# Patient Record
Sex: MALE | Race: WHITE
[De-identification: names, ages, dates, MRNs, and addresses within clinical notes are randomized per-mention and may not be internally consistent; named-entity substitution may affect disease eponyms.]

---

## 2018-02-20 ENCOUNTER — HOSPITAL ENCOUNTER (EMERGENCY)
Dept: HOSPITAL 80 - FED | Age: 38
Discharge: HOME | End: 2018-02-20
Payer: COMMERCIAL

## 2018-02-20 VITALS
OXYGEN SATURATION: 98 % | TEMPERATURE: 97.7 F | SYSTOLIC BLOOD PRESSURE: 112 MMHG | HEART RATE: 99 BPM | RESPIRATION RATE: 16 BRPM | DIASTOLIC BLOOD PRESSURE: 45 MMHG

## 2018-02-20 DIAGNOSIS — E86.9: ICD-10-CM

## 2018-02-20 DIAGNOSIS — F17.200: ICD-10-CM

## 2018-02-20 DIAGNOSIS — N41.9: Primary | ICD-10-CM

## 2018-02-20 LAB
INR PPP: 1.04 (ref 0.83–1.16)
PLATELET # BLD: 267 10^3/UL (ref 150–400)
PROTHROMBIN TIME: 13.8 SEC (ref 12–15)

## 2018-02-20 NOTE — EDPHY
H & P


Stated Complaint: penis pain


Time Seen by Provider: 02/20/18 12:28


HPI/ROS: 





CHIEF COMPLAINT:  Penile pain





HISTORY OF PRESENT ILLNESS:  The patient is a 37-year-old man who is here with 

his partner complaining of pain in his penile shaft.  He states that he was 

diagnosed 4 weeks ago with prostatitis and put on a course of Flomax and 

Bactrim.  His symptoms improved over the 14 day course.  He felt well until 

about 5 days ago when he started having pain again that felt similar to when he 

had prostate.  The pain was mostly with defecation.  He states that he was 

restarted on his Bactrim and Flomax and that after about 48 hr his symptoms 

improved.  Yesterday however he began having pain in both inguinal regions as 

well as burning with urination.  No discharge.  No rectal or perineal pain.  No 

fever.  He states that the inguinal pain however resolved and now is having 

pain in the shaft of his penis.  No swelling or erythema.  No testicular pain.  

He states that it was severe and intense and intermittent.  He states that it 

has now resolved.  It was most intense about an hour ago and he thought that he 

might die.  He did take naproxen about 4 hr ago with some improvement.  No 

history of abdominal surgery.








REVIEW OF SYSTEMS:


Constitutional:  denies: chills, fever, recent illness, recent injury


EENTM: denies: blurred vision, double vision, nose congestion


Respiratory: denies: cough, shortness of breath


Cardiac: denies: chest pain, irregular heart rate, lightheadedness, palpitations


Gastrointestinal/Abdominal: denies: abdominal pain, diarrhea, nausea, vomiting, 

blood streaked stools


Genitourinary:  See HPI


Musculoskeletal: denies: joint pain, muscle pain


Skin: denies: lesions, rash, jaundice, bruising


Neurological: denies: headache, numbness, paresthesia, tingling, dizziness, 

weakness


Hematologic/Lymphatic: denies: blood clots, easy bleeding, easy bruising


Immunologic/allergic: denies: HIV/AIDS, transplant








EXAM:


GENERAL:  Well-appearing, well-nourished and in no acute distress.


HEAD:  Atraumatic, normocephalic.


EYES:  Pupils equal round and reactive to light, extraocular movements intact, 

sclera anicteric, conjunctiva are normal.


ENT:  TMs normal, nares patent, oropharynx clear without exudates.  Moist 

mucous membranes.


NECK:  Normal range of motion, supple without lymphadenopathy or JVD.


LUNGS:  Breath sounds clear to auscultation bilaterally and equal.  No wheezes 

rales or rhonchi.


HEART:  Regular rate and rhythm without murmurs, rubs or gallops.


ABDOMEN:  Soft, nontender, normoactive bowel sounds.  No guarding, no rebound.  

No masses appreciated.  No palpable hernia, nontender and nonswollen testicles, 

no swelling or erythema to the penile shaft.  No discharge with milking but 

significant pain.


BACK:  No CVA tenderness, no spinal tenderness, step-offs or deformities


EXTREMITIES:  Normal range of motion, no pitting or edema.  No clubbing or 

cyanosis.


NEUROLOGICAL:  Cranial nerves II through XII grossly intact.  Normal speech, 

normal gait.  5/5 strength, normal movement in all extremities, normal sensation


PSYCH:  Normal mood, normal affect.


SKIN:  Warm, dry, normal turgor, no visible rashes or lesions.








Source: Patient


Exam Limitations: No limitations





- Personal History


Current Tetanus/Diphtheria Vaccine: Yes


Current Tetanus Diphtheria and Acellular Pertussis (TDAP): Yes





- Medical/Surgical History


Hx Asthma: No


Hx Chronic Respiratory Disease: No


Hx Diabetes: No


Hx Cardiac Disease: No


Hx Renal Disease: No


Hx Cirrhosis: No


Hx Alcoholism: No


Hx HIV/AIDS: No


Hx Splenectomy or Spleen Trauma: No


Other PMH: prostatitis,





- Family History


Significant Family History: No pertinent family hx





- Social History


Smoking Status: Current every day smoker


Alcohol Use: Sober


Drug Use: None


Constitutional: 


 Initial Vital Signs











Temperature (C)  36.9 C   02/20/18 12:16


 


Heart Rate  113 H  02/20/18 12:16


 


Respiratory Rate  24 H  02/20/18 12:16


 


Blood Pressure  117/102 H  02/20/18 12:16


 


O2 Sat (%)  95   02/20/18 12:16








 











O2 Delivery Mode               Room Air














Allergies/Adverse Reactions: 


 





erythromycin base Allergy (Verified 02/20/18 12:14)


 








Home Medications: 














 Medication  Instructions  Recorded


 


QUEtiapine FUMARATE [Seroquel] 0 02/17/12


 


Ambien  02/20/18


 


Ciprofloxacin [Cipro] 500 mg PO BID #60 tab 02/20/18


 


Dexedrine  02/20/18


 


Diazepam  02/20/18


 


Flomax  02/20/18


 


LaMICtal  02/20/18


 


Naproxen  02/20/18


 


Phenazopyridine HCl [Pyridium] 200 mg PO TID #6 tab 02/20/18














Medical Decision Making





- Diagnostics


EKG Interpretation: 





An EKG obtained and was read and documented in trace view.  Please see trace 

view for full reading and report.  Sinus rhythm, normal QT interval 


Imaging Results: 


 Imaging Impressions





Abdomen/Pelvis CT  02/20/18 13:04


Impression: 


1. No evidence of urinary tract calculus.


2. No significant abnormality identified within the abdomen and pelvis.


3. Mild constipation.


 


Attention: This CT examination is specifically designed to evaluate patients 

who are clinically suspected of having acute obstructive uropathy.  This 

examination does not use radiographic contrast, and as such, provides only a  

limited evaluation of the abdomen, pelvis and retroperitoneum.  If there is 

further clinical suspicion for pathological conditions other than obstructive 

uropathy, a complete CT evaluation of the abdomen and pelvis utilizing 

intravenous, oral, and rectal contrast should be considered. 


 


Findings discussed with Carlos Marin M.D.  at  13:50 hour, 2/20/2018.


 


 


 











Imaging: Discussed imaging studies w/ On call Radiologist


ED Course/Re-evaluation: 





We discussed the patient's lab work and CT scan which are reassuring.  I 

suspect that he has prostatitis resistant to Bactrim.  I will switch him to 

Cipro.  We discussed the risks of taking fluoroquinolones and encouraged him 

not to stresses tendons.  He agrees with this.  He also will continue taking 

naproxen and I will have him continue taking Flomax in add Pyridium.  We 

discussed indications for returning.  He is happy with this plan.  I will 

obtain an EKG to make sure he does not have a long QT.


Differential Diagnosis: 





Partial list of the Differential diagnosis considered include but were not 

limited to;  prostatitis, SVT, urinary tract infection, kidney stone and 

although unlikely based on the history and physical exam, I also considered 

hernia, testicular torsion, bladder stone, diverticulitis, appendicitis.  I 

discussed these differential diagnoses and the plan with the patient as well as 

the usual and expected course.  The patient understands that the diagnosis is 

provisional and that in medicine we are not always correct and that further 

workup is often warranted.  Usual and customary warnings were given.  All of 

the patient's questions were answered.  The patient was instructed to return to 

the emergency department should the symptoms at all worsen or return, otherwise 

to followup with the physician as we discussed.





- Data Points


Laboratory Results: 


 Laboratory Results





 02/20/18 12:30 





 02/20/18 12:30 





 











  02/20/18 02/20/18 02/20/18





  12:30 12:30 12:30


 


WBC      





    


 


RBC      





    


 


Hgb      





    


 


Hct      





    


 


MCV      





    


 


MCH      





    


 


MCHC      





    


 


RDW      





    


 


Plt Count      





    


 


MPV      





    


 


Neut % (Auto)      





    


 


Lymph % (Auto)      





    


 


Mono % (Auto)      





    


 


Eos % (Auto)      





    


 


Baso % (Auto)      





    


 


Nucleat RBC Rel Count      





    


 


Absolute Neuts (auto)      





    


 


Absolute Lymphs (auto)      





    


 


Absolute Monos (auto)      





    


 


Absolute Eos (auto)      





    


 


Absolute Basos (auto)      





    


 


Absolute Nucleated RBC      





    


 


Immature Gran %      





    


 


Immature Gran #      





    


 


PT      13.8 SEC SEC





     (12.0-15.0) 


 


INR      1.04 





     (0.83-1.16) 


 


APTT      32.5 SEC SEC





     (23.0-38.0) 


 


Sodium    142 mEq/L mEq/L  





    (135-145)  


 


Potassium    3.7 mEq/L mEq/L  





    (3.5-5.2)  


 


Chloride    104 mEq/L mEq/L  





    ()  


 


Carbon Dioxide    24 mEq/l mEq/l  





    (22-31)  


 


Anion Gap    14 mEq/L mEq/L  





    (8-16)  


 


BUN    22 mg/dL mg/dL  





    (7-23)  


 


Creatinine    1.0 mg/dL mg/dL  





    (0.7-1.3)  


 


Estimated GFR    > 60   





    


 


Glucose    72 mg/dL mg/dL  





    ()  


 


Calcium    10.5 mg/dL H mg/dL  





    (8.5-10.4)  


 


Total Bilirubin    1.0 mg/dL mg/dL  





    (0.1-1.4)  


 


Conjugated Bilirubin    0.5 mg/dL mg/dL  





    (0.0-0.5)  


 


Unconjugated Bilirubin    0.5 mg/dL mg/dL  





    (0.0-1.1)  


 


AST    42 IU/L IU/L  





    (17-59)  


 


ALT    39 IU/L IU/L  





    (21-72)  


 


Alkaline Phosphatase    55 IU/L IU/L  





    ()  


 


Total Protein    7.9 g/dL g/dL  





    (6.3-8.2)  


 


Albumin    4.8 g/dL g/dL  





    (3.5-5.0)  


 


Lipase    101 IU/L IU/L  





    ()  


 


Urine Color  YELLOW     





    


 


Urine Appearance  CLEAR     





    


 


Urine pH  8.0  H     





   (5.0-7.5)   


 


Ur Specific Gravity  1.015     





   (1.002-1.030)   


 


Urine Protein  NEGATIVE     





   (NEGATIVE)   


 


Urine Ketones  TRACE  H     





   (NEGATIVE)   


 


Urine Blood  NEGATIVE     





   (NEGATIVE)   


 


Urine Nitrate  NEGATIVE     





   (NEGATIVE)   


 


Urine Bilirubin  NEGATIVE     





   (NEGATIVE)   


 


Urine Urobilinogen  NEGATIVE EU EU    





   (0.2-1.0)   


 


Ur Leukocyte Esterase  NEGATIVE     





   (NEGATIVE)   


 


Urine RBC  NONE SEEN /hpf /hpf    





   (0-3)   


 


Urine WBC  1-3 /hpf /hpf    





   (0-3)   


 


Ur Epithelial Cells  NONE SEEN /lpf /lpf    





   (NONE-1+)   


 


Urine Mucus  TRACE /lpf /lpf    





   (NONE-1+)   


 


Urine Glucose  NEGATIVE     





   (NEGATIVE)   














  02/20/18





  12:30


 


WBC  6.85 10^3/uL 10^3/uL





   (3.80-9.50) 


 


RBC  5.49 10^6/uL 10^6/uL





   (4.40-6.38) 


 


Hgb  17.8 g/dL H g/dL





   (13.7-17.5) 


 


Hct  51.2 % H %





   (40.0-51.0) 


 


MCV  93.3 fL fL





   (81.5-99.8) 


 


MCH  32.4 pg pg





   (27.9-34.1) 


 


MCHC  34.8 g/dL g/dL





   (32.4-36.7) 


 


RDW  12.1 % %





   (11.5-15.2) 


 


Plt Count  267 10^3/uL 10^3/uL





   (150-400) 


 


MPV  9.2 fL fL





   (8.7-11.7) 


 


Neut % (Auto)  51.5 % %





   (39.3-74.2) 


 


Lymph % (Auto)  37.8 % %





   (15.0-45.0) 


 


Mono % (Auto)  6.7 % %





   (4.5-13.0) 


 


Eos % (Auto)  2.8 % %





   (0.6-7.6) 


 


Baso % (Auto)  0.6 % %





   (0.3-1.7) 


 


Nucleat RBC Rel Count  0.0 % %





   (0.0-0.2) 


 


Absolute Neuts (auto)  3.53 10^3/uL 10^3/uL





   (1.70-6.50) 


 


Absolute Lymphs (auto)  2.59 10^3/uL 10^3/uL





   (1.00-3.00) 


 


Absolute Monos (auto)  0.46 10^3/uL 10^3/uL





   (0.30-0.80) 


 


Absolute Eos (auto)  0.19 10^3/uL 10^3/uL





   (0.03-0.40) 


 


Absolute Basos (auto)  0.04 10^3/uL 10^3/uL





   (0.02-0.10) 


 


Absolute Nucleated RBC  0.00 10^3/uL 10^3/uL





   (0-0.01) 


 


Immature Gran %  0.6 % %





   (0.0-1.1) 


 


Immature Gran #  0.04 10^3/uL 10^3/uL





   (0.00-0.10) 


 


PT  





  


 


INR  





  


 


APTT  





  


 


Sodium  





  


 


Potassium  





  


 


Chloride  





  


 


Carbon Dioxide  





  


 


Anion Gap  





  


 


BUN  





  


 


Creatinine  





  


 


Estimated GFR  





  


 


Glucose  





  


 


Calcium  





  


 


Total Bilirubin  





  


 


Conjugated Bilirubin  





  


 


Unconjugated Bilirubin  





  


 


AST  





  


 


ALT  





  


 


Alkaline Phosphatase  





  


 


Total Protein  





  


 


Albumin  





  


 


Lipase  





  


 


Urine Color  





  


 


Urine Appearance  





  


 


Urine pH  





  


 


Ur Specific Gravity  





  


 


Urine Protein  





  


 


Urine Ketones  





  


 


Urine Blood  





  


 


Urine Nitrate  





  


 


Urine Bilirubin  





  


 


Urine Urobilinogen  





  


 


Ur Leukocyte Esterase  





  


 


Urine RBC  





  


 


Urine WBC  





  


 


Ur Epithelial Cells  





  


 


Urine Mucus  





  


 


Urine Glucose  





  











Medications Given: 


 








Discontinued Medications





Ciprofloxacin (Cipro)  500 mg PO EDNOW ONE


   PRN Reason: Protocol


   Stop: 02/20/18 14:08


   Last Admin: 02/20/18 14:16 Dose:  500 mg


Sodium Chloride (Ns)  1,000 mls @ 0 mls/hr IV EDNOW ONE; Wide Open


   PRN Reason: Protocol


   Stop: 02/20/18 13:05


   Last Admin: 02/20/18 13:07 Dose:  1,000 mls


Lidocaine HCl 69 mg/ Sodium (Chloride)  106.9 mls @ 600 mls/hr IV EDNOW ONE


   Stop: 02/20/18 13:26


   Last Admin: 02/20/18 13:46 Dose:  106.9 mls


Phenazopyridine HCl (Pyridium)  200 mg PO EDNOW ONE


   Stop: 02/20/18 14:08


   Last Admin: 02/20/18 14:15 Dose:  200 mg








Departure





- Departure


Disposition: Home, Routine, Self-Care


Clinical Impression: 


Prostatitis


Qualifiers:


 Prostatitis type: unspecified Qualified Code(s): N41.9 - Inflammatory disease 

of prostate, unspecified





Condition: Fair


Instructions:  Prostatitis (ED)


Referrals: 


NONE *PRIMARY CARE P,. [Primary Care Provider] - 2-3 days, call for appt.


Prescriptions: 


Ciprofloxacin [Cipro] 500 mg PO BID #60 tab


Phenazopyridine HCl [Pyridium] 200 mg PO TID #6 tab

## 2018-02-20 NOTE — CPEKG
Heart Rate: 93

RR Interval: 645

P-R Interval: 204

QRSD Interval: 76

QT Interval: 336

QTC Interval: 418

P Axis: 75

QRS Axis: 67

T Wave Axis: 52

EKG Severity - NORMAL ECG -

EKG Impression: SINUS RHYTHM

Electronically Signed By: Carlos Marin 20-Feb-2018 14:21:53